# Patient Record
(demographics unavailable — no encounter records)

---

## 2024-12-27 NOTE — HISTORY OF PRESENT ILLNESS
[FreeTextEntry1] : Kyle is a 28-year-old male who presents with his aide complaining of pain from both his big toenails and thick, hard, dry skin.  He wears support stockings for his circulation, and it makes these conditions worse.

## 2024-12-27 NOTE — PHYSICAL EXAM
[FreeTextEntry3] : Vascular Exam: DP Pulse (left): 1/4. DP Pulse (right): 1/4. PT Pulse (left): 1/4. PT Pulse (right): 1/4. Capillary Return - L: Instantaneous. Capillary Return - R: Instantaneous. Temperature Grad - L: Hot to Warm. Temperature Grad - R: Hot to Warm. + 2 pitting edema bilaterally. [de-identified] : Orthopedic Exam: No structural deformities present. [FreeTextEntry1] : Neurological Exam: Sharp / Dull - L: WNL. Sharp / Dull - R: WNL. Light Touch/pressure - L: WNL. Light Touch/pressure - R: WNL. Hot/cold - L: WNL. Hot/cold - R: WNL. Vibratory - L: WNL. Vibratory - R: WNL.

## 2024-12-27 NOTE — PROCEDURE
[FreeTextEntry1] : Plan:  Examination.  (Co=[id number]).  We had a lengthy discussion concerning the patient's conditions.  The incurvated toenails were removed.  Skin was intact.  Patient expressed relief after the procedures.  They were dressed with Betadine, Neosporin and a dressing.  Patient to keep covered until next shower.  We discussed recurrence and more agressive treatment alternatives.  Surgical debridement of the affected skin area.  Rx: Lac hydrin lotion to be applied daily. Patient to return: 1 month.

## 2024-12-27 NOTE — REVIEW OF SYSTEMS
0930: Pt brought back to pre-op holding area via wheelchair and assumed care, she was able to stand at scale, friend and SO at bedside, tolerated transfers.  1100: Pt helped to RR via WC, tolerated it well.  1103: Patient allergies and NPO status verified, home medication reconciliation completed and belongings secured. Patient verbalizes understanding of pain scale, expected course of stay and plan of care. Surgical site verified with patient. IV access established. Sequentials placed on legs.   [Negative] : Constitutional [FreeTextEntry5] : PVD, swelling [de-identified] : dry skin, painful toenails